# Patient Record
Sex: MALE | Race: ASIAN
[De-identification: names, ages, dates, MRNs, and addresses within clinical notes are randomized per-mention and may not be internally consistent; named-entity substitution may affect disease eponyms.]

---

## 2020-10-09 ENCOUNTER — HOSPITAL ENCOUNTER (EMERGENCY)
Dept: HOSPITAL 26 - MED | Age: 30
Discharge: HOME | End: 2020-10-09
Payer: SELF-PAY

## 2020-10-09 VITALS — DIASTOLIC BLOOD PRESSURE: 99 MMHG | SYSTOLIC BLOOD PRESSURE: 149 MMHG

## 2020-10-09 VITALS — BODY MASS INDEX: 22.19 KG/M2 | WEIGHT: 155 LBS | HEIGHT: 70 IN

## 2020-10-09 VITALS — DIASTOLIC BLOOD PRESSURE: 67 MMHG | SYSTOLIC BLOOD PRESSURE: 129 MMHG

## 2020-10-09 DIAGNOSIS — R55: ICD-10-CM

## 2020-10-09 DIAGNOSIS — R00.0: Primary | ICD-10-CM

## 2020-10-09 LAB
ALBUMIN FLD-MCNC: 3.5 G/DL (ref 3.4–5)
ANION GAP SERPL CALCULATED.3IONS-SCNC: 29.7 MMOL/L (ref 8–16)
AST SERPL-CCNC: 127 U/L (ref 15–37)
BARBITURATES UR QL SCN: NEGATIVE NG/ML
BASOPHILS # BLD AUTO: 0 K/UL (ref 0–0.22)
BASOPHILS NFR BLD AUTO: 0.3 % (ref 0–2)
BENZODIAZ UR QL SCN: NEGATIVE NG/ML
BILIRUB SERPL-MCNC: 1.5 MG/DL (ref 0–1)
BUN SERPL-MCNC: 9 MG/DL (ref 7–18)
BZE UR QL SCN: NEGATIVE NG/ML
CANNABINOIDS UR QL SCN: NEGATIVE NG/ML
CHLORIDE SERPL-SCNC: 94 MMOL/L (ref 98–107)
CO2 SERPL-SCNC: 11.2 MMOL/L (ref 21–32)
CREAT SERPL-MCNC: 1.7 MG/DL (ref 0.6–1.3)
EOSINOPHIL # BLD AUTO: 0 K/UL (ref 0–0.4)
EOSINOPHIL NFR BLD AUTO: 0 % (ref 0–4)
ERYTHROCYTE [DISTWIDTH] IN BLOOD BY AUTOMATED COUNT: 13.8 % (ref 11.6–13.7)
GFR SERPL CREATININE-BSD FRML MDRD: 62 ML/MIN (ref 90–?)
GLUCOSE SERPL-MCNC: 280 MG/DL (ref 74–106)
HCT VFR BLD AUTO: 43.7 % (ref 36–52)
HGB BLD-MCNC: 14.2 G/DL (ref 12–18)
LYMPHOCYTES # BLD AUTO: 0.5 K/UL (ref 2–11.5)
LYMPHOCYTES NFR BLD AUTO: 3.6 % (ref 20.5–51.1)
MCH RBC QN AUTO: 31 PG (ref 27–31)
MCHC RBC AUTO-ENTMCNC: 33 G/DL (ref 33–37)
MCV RBC AUTO: 96.4 FL (ref 80–94)
MONOCYTES # BLD AUTO: 1.2 K/UL (ref 0.8–1)
MONOCYTES NFR BLD AUTO: 8.9 % (ref 1.7–9.3)
NEUTROPHILS # BLD AUTO: 11.7 K/UL (ref 1.8–7.7)
NEUTROPHILS NFR BLD AUTO: 87.2 % (ref 42.2–75.2)
OPIATES UR QL SCN: NEGATIVE NG/ML
PCP UR QL SCN: NEGATIVE NG/ML
PLATELET # BLD AUTO: 117 K/UL (ref 140–450)
POTASSIUM SERPL-SCNC: 3.9 MMOL/L (ref 3.5–5.1)
RBC # BLD AUTO: 4.54 MIL/UL (ref 4.2–6.1)
SODIUM SERPL-SCNC: 131 MMOL/L (ref 136–145)
WBC # BLD AUTO: 13.4 K/UL (ref 4.8–10.8)

## 2020-10-09 PROCEDURE — G0482 DRUG TEST DEF 15-21 CLASSES: HCPCS

## 2020-10-09 PROCEDURE — 96375 TX/PRO/DX INJ NEW DRUG ADDON: CPT

## 2020-10-09 PROCEDURE — 70450 CT HEAD/BRAIN W/O DYE: CPT

## 2020-10-09 PROCEDURE — 36415 COLL VENOUS BLD VENIPUNCTURE: CPT

## 2020-10-09 PROCEDURE — 84484 ASSAY OF TROPONIN QUANT: CPT

## 2020-10-09 PROCEDURE — 80053 COMPREHEN METABOLIC PANEL: CPT

## 2020-10-09 PROCEDURE — 93005 ELECTROCARDIOGRAM TRACING: CPT

## 2020-10-09 PROCEDURE — 85025 COMPLETE CBC W/AUTO DIFF WBC: CPT

## 2020-10-09 PROCEDURE — 80305 DRUG TEST PRSMV DIR OPT OBS: CPT

## 2020-10-09 PROCEDURE — 96374 THER/PROPH/DIAG INJ IV PUSH: CPT

## 2020-10-09 PROCEDURE — 99285 EMERGENCY DEPT VISIT HI MDM: CPT
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
## 2020-10-09 NOTE — NUR
0243--- cardizem 20mg given. no success with decreasing heart rate

0249---- metropolol 5mg given. heart decreased in the 110s.

## 2020-10-09 NOTE — NUR
PT CLEARED TO BE DISCHARGED.  CALLED PT'S MOTHER, V, ADVISING PT IS BEING 
DISCHARGED.  THEY WILL BE HARE TO  PATIENT.

## 2020-10-09 NOTE — NUR
PT'S MOTHER, V, CALLED FOR UPDATE ON HER SON.  ADVISED HE IS A/O X4 AND 
HEARTRATE HAS GONE DOWN.  SHE PROVIDED HER PHONE NUMBER [phone].

## 2022-08-12 ENCOUNTER — HOSPITAL ENCOUNTER (EMERGENCY)
Dept: HOSPITAL 26 - MED | Age: 32
Discharge: HOME | End: 2022-08-12
Payer: COMMERCIAL

## 2022-08-12 VITALS — DIASTOLIC BLOOD PRESSURE: 78 MMHG | SYSTOLIC BLOOD PRESSURE: 125 MMHG

## 2022-08-12 VITALS — SYSTOLIC BLOOD PRESSURE: 107 MMHG | DIASTOLIC BLOOD PRESSURE: 76 MMHG

## 2022-08-12 VITALS — BODY MASS INDEX: 20.36 KG/M2 | HEIGHT: 70 IN | WEIGHT: 142.25 LBS

## 2022-08-12 DIAGNOSIS — G47.00: ICD-10-CM
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
## 2022-08-12 DIAGNOSIS — H53.9: Primary | ICD-10-CM

## 2022-08-12 DIAGNOSIS — F10.10: ICD-10-CM

## 2022-08-12 NOTE — NUR
Patient discharged with v/s stable. Written and verbal after care instructions 
given. 

Patient alert, oriented and verbalized understanding of instructions. 
Ambulatory with steady gait. All questions addressed prior to discharge. ID 
band removed. Patient advised to follow up with PMD. Rx of Thiamine and Ativan 
given. Opportunity to ask questions provided and answered. MENTAL RESOURCE 
PACKET GIVEN.

## 2022-08-12 NOTE — NUR
-------------------------------------------------------------------------------

           *** Note katone in EDM - 08/12/22 at 1621 by MELITA ***            

-------------------------------------------------------------------------------

Patient discharged with v/s stable. Written and verbal after care instructions 
given. 

Patient alert, oriented and verbalized understanding of instructions. 
Ambulatory with steady gait. All questions addressed prior to discharge. ID 
band removed. Patient advised to follow up with PMD. Rx of Thiamine and Ativan 
given. Opportunity to ask questions provided and answered. MENTAL RESOURCE 
PACKET GIVEN.

## 2022-08-12 NOTE — NUR
31Y.O. M C/O VISUAL HALLUCINATIONS X 1 WEEK. PT ALSO STATED HE HAS TROUBLE 
SLEEPING. DENIES PAIN. PTS MOM SAYS HE SEE THINGS THAT ARE NOT THERE AND 
SOMETIMES TALKS TO THEM. PT DENIES SI, AND AUDITORY HALLUCINATIONS AT THIS 
TIME. A&OX3, SKIN INTACT, VITALS WNL FOR PT AND STAEDY GAIT. 



NKA

HX:HALLUCINATIONS,